# Patient Record
Sex: FEMALE | Race: WHITE | NOT HISPANIC OR LATINO | Employment: OTHER | URBAN - METROPOLITAN AREA
[De-identification: names, ages, dates, MRNs, and addresses within clinical notes are randomized per-mention and may not be internally consistent; named-entity substitution may affect disease eponyms.]

---

## 2017-01-10 ENCOUNTER — OFFICE VISIT (OUTPATIENT)
Dept: FAMILY MEDICINE CLINIC | Facility: CLINIC | Age: 82
End: 2017-01-10

## 2017-01-10 VITALS
BODY MASS INDEX: 38.87 KG/M2 | OXYGEN SATURATION: 97 % | WEIGHT: 198 LBS | SYSTOLIC BLOOD PRESSURE: 130 MMHG | HEART RATE: 64 BPM | TEMPERATURE: 97.6 F | DIASTOLIC BLOOD PRESSURE: 78 MMHG | HEIGHT: 60 IN

## 2017-01-10 DIAGNOSIS — R41.3 MEMORY IMPAIRMENT: Primary | ICD-10-CM

## 2017-01-10 PROCEDURE — 99213 OFFICE O/P EST LOW 20 MIN: CPT | Performed by: FAMILY MEDICINE

## 2017-01-10 NOTE — MR AVS SNAPSHOT
Suzanne Wild   1/10/2017 2:00 PM   Office Visit    Dept Phone:  [phone]   Encounter #:  [id number]    Provider:  Lizzette Buckley MD   Department:  [facility]                Your Full Care Plan              Your Updated Medication List          This list is accurate as of: 1/10/17  2:48 PM.  Always use your most recent med list.                ASPIR-81 PO       CALCIUM 600 + D PO       Vitamin D3 3000 UNITS tablet               We Performed the Following     Ambulatory Referral to Neuropsychology     Basic Metabolic Panel     Thyroid Cascade Profile     Vitamin B12 & Folate       You Were Diagnosed With        Codes Comments    Memory impairment    -  Primary ICD-10-CM: R41.3  ICD-9-CM: 780.93       Instructions     None    Patient Instructions History      Upcoming Appointments     Visit Type Date Time Department    OFFICE VISIT 1/10/2017  2:00 PM [facility]      [employer] Signup     [employer] allows you to send messages to your doctor, view your test results, renew your prescriptions, schedule appointments, and more. To sign up, go to [employer] and click on the Sign Up Now link in the New User? box. Enter your [employer] Activation Code exactly as it appears below along with the last four digits of your Social Security Number and your Date of Birth () to complete the sign-up process. If you do not sign up before the expiration date, you must request a new code.    [employer] Activation Code: [email or web address]  Expires: 2017  2:48 PM    If you have questions, you can email [email or web address] or call [phone] to talk to our [employer] staff. Remember, [employer] is NOT to be used for urgent needs. For medical emergencies, dial 911.               Other Info from Your Visit           Allergies     Codeine  Rash      Reason for Visit     Memory Loss           Vital Signs     Blood Pressure Pulse Temperature Height Weight Oxygen Saturation  "   130/78 64 97.6 °F (36.4 °C) 60\" (152.4 cm) 198 lb (89.8 kg) 97%    Body Mass Index Smoking Status                38.67 kg/m2 Never Smoker          Problems and Diagnoses Noted     Memory impairment    -  Primary        "

## 2017-01-10 NOTE — PROGRESS NOTES
Subjective   Suzanne Wild is a 83 y.o. female.   Chief Complaint   Patient presents with   • Memory Loss       History of Present Illness     #1 memory loss-started several years ago.  Patient cannot think of the word when she talks.  She forgets things that people told her.  She does not have problems with missing appointments, she has no problems with driving, or finding her way.  She does not misplace things.  No family history of Alzheimer disease.  She had a head CT done in June 2016 which was negative.    The following portions of the patient's history were reviewed and updated as appropriate: allergies, current medications, past family history, past medical history, past social history and problem list.    Review of Systems   Constitutional: Negative.    Respiratory: Negative.    Cardiovascular: Negative.    Psychiatric/Behavioral: Negative.  Negative for suicidal ideas. The patient is not nervous/anxious.          Objective   Wt Readings from Last 3 Encounters:   01/10/17 198 lb (89.8 kg)   06/26/16 200 lb (90.7 kg)   05/02/16 199 lb 8 oz (90.5 kg)      Vitals:    01/10/17 1324   BP: 130/78   Pulse: 64   Temp: 97.6 °F (36.4 °C)   SpO2: 97%     Temp Readings from Last 3 Encounters:   01/10/17 97.6 °F (36.4 °C)   06/26/16 97.1 °F (36.2 °C) (Tympanic)   05/02/16 97.8 °F (36.6 °C)     BP Readings from Last 3 Encounters:   01/10/17 130/78   06/26/16 151/96   05/02/16 140/78     Pulse Readings from Last 3 Encounters:   01/10/17 64   06/26/16 70   05/02/16 64       Physical Exam   Constitutional: She is oriented to person, place, and time. She appears well-developed and well-nourished.   HENT:   Head: Normocephalic and atraumatic.   Neck: Neck supple. Carotid bruit is not present. No thyromegaly present.   Cardiovascular: Normal rate, regular rhythm and normal heart sounds.    Pulmonary/Chest: Effort normal and breath sounds normal.   Neurological: She is alert and oriented to person, place, and time.   Skin:  Skin is warm, dry and intact.   Psychiatric: She has a normal mood and affect. Her behavior is normal.       Assessment/Plan   Suzanne was seen today for memory loss.    Diagnoses and all orders for this visit:    Memory impairment  -     Vitamin B12 & Folate  -     Thyroid Cascade Profile  -     Basic Metabolic Panel  -     Ambulatory Referral to Neuropsychology        #1 decreased memory-check labs.  Referral to neuropsychologist for testing.

## 2017-01-11 LAB
BUN SERPL-MCNC: 10 MG/DL (ref 8–23)
BUN/CREAT SERPL: 14.1 (ref 7–25)
CALCIUM SERPL-MCNC: 9.8 MG/DL (ref 8.6–10.5)
CHLORIDE SERPL-SCNC: 101 MMOL/L (ref 98–107)
CO2 SERPL-SCNC: 28.2 MMOL/L (ref 22–29)
CREAT SERPL-MCNC: 0.71 MG/DL (ref 0.57–1)
FOLATE SERPL-MCNC: 9.13 NG/ML (ref 4.78–24.2)
GLUCOSE SERPL-MCNC: 73 MG/DL (ref 65–99)
POTASSIUM SERPL-SCNC: 4.3 MMOL/L (ref 3.5–5.2)
SODIUM SERPL-SCNC: 141 MMOL/L (ref 136–145)
TSH SERPL DL<=0.005 MIU/L-ACNC: 1.81 UIU/ML (ref 0.45–4.5)
VIT B12 SERPL-MCNC: 240 PG/ML (ref 211–946)

## 2017-02-15 ENCOUNTER — OFFICE VISIT (OUTPATIENT)
Dept: FAMILY MEDICINE CLINIC | Facility: CLINIC | Age: 82
End: 2017-02-15

## 2017-02-15 DIAGNOSIS — G30.1 LATE ONSET ALZHEIMER'S DISEASE WITHOUT BEHAVIORAL DISTURBANCE (HCC): Primary | ICD-10-CM

## 2017-02-15 DIAGNOSIS — F02.80 LATE ONSET ALZHEIMER'S DISEASE WITHOUT BEHAVIORAL DISTURBANCE (HCC): Primary | ICD-10-CM

## 2017-02-15 PROCEDURE — 99213 OFFICE O/P EST LOW 20 MIN: CPT | Performed by: FAMILY MEDICINE

## 2017-02-15 RX ORDER — DONEPEZIL HYDROCHLORIDE 10 MG/1
10 TABLET, FILM COATED ORAL NIGHTLY
Qty: 90 TABLET | Refills: 1 | Status: SHIPPED | OUTPATIENT
Start: 2017-02-15 | End: 2017-07-11 | Stop reason: SDUPTHER

## 2017-02-15 NOTE — PROGRESS NOTES
Subjective   Suzanne Wild is a 83 y.o. female.   No chief complaint on file.      History of Present Illness     #1 Alzheimer disease with late-onset- at last office visit I referred patient for evaluation at [facility].  She is here today to discuss results.  Patient was diagnosed with emerging Alzheimer disease, with late onset.  It was recommended that she will be started on Aricept.  It was recommended that she will stop driving.  She was provided with brochures about support network.  Inflamation was also given to her son.  Patient has living will and DNR.  Her sister lives close to her and she is going to rely on her with driving.    The following portions of the patient's history were reviewed and updated as appropriate: allergies, current medications, past medical history, past social history and problem list.    Review of Systems   Constitutional: Negative.    Respiratory: Negative.    Cardiovascular: Negative.          Objective   Wt Readings from Last 3 Encounters:   01/10/17 198 lb (89.8 kg)   06/26/16 200 lb (90.7 kg)   05/02/16 199 lb 8 oz (90.5 kg)    There were no vitals filed for this visit.  Temp Readings from Last 3 Encounters:   01/10/17 97.6 °F (36.4 °C)   06/26/16 97.1 °F (36.2 °C) (Tympanic)   05/02/16 97.8 °F (36.6 °C)     BP Readings from Last 3 Encounters:   01/10/17 130/78   06/26/16 151/96   05/02/16 140/78     Pulse Readings from Last 3 Encounters:   01/10/17 64   06/26/16 70   05/02/16 64       Physical Exam   Constitutional: She is oriented to person, place, and time. She appears well-developed and well-nourished.   HENT:   Head: Normocephalic and atraumatic.   Neck: Neck supple. Carotid bruit is not present. No thyromegaly present.   Cardiovascular: Normal rate, regular rhythm and normal heart sounds.    Pulmonary/Chest: Effort normal and breath sounds normal.   Neurological: She is alert and oriented to person, place, and time.   Skin: Skin is warm, dry and intact.    Psychiatric: She has a normal mood and affect. Her behavior is normal.       Assessment/Plan   Diagnoses and all orders for this visit:    Late onset Alzheimer's disease without behavioral disturbance    Other orders  -     donepezil (ARICEPT) 10 MG tablet; Take 1 tablet by mouth Every Night.        #1 Alzheimer disease-new diagnosis, we are starting Aricept 10 mg a day.  We talked about advanced planning for the future.  I encouraged patient to discuss it with her family.  We talked about DNR.  Patient was advised not to drive.  Follow-up in 6 months, or sooner if problems.  She is due for follow-up at [facility] in January 2018.

## 2017-02-20 ENCOUNTER — TELEPHONE (OUTPATIENT)
Dept: FAMILY MEDICINE CLINIC | Facility: CLINIC | Age: 82
End: 2017-02-20

## 2017-02-20 NOTE — TELEPHONE ENCOUNTER
----- Message from Lizzette Buckley MD sent at 2/20/2017 11:02 AM EST -----  Not at this time.  ----- Message -----     From: Bonita Núñez MA     Sent: 2/20/2017  10:01 AM       To: MD Dr cristy Welch told her at the evaluation that she would be put on two medications. Should she also be on namenda

## 2017-07-11 ENCOUNTER — OFFICE VISIT (OUTPATIENT)
Dept: FAMILY MEDICINE CLINIC | Facility: CLINIC | Age: 82
End: 2017-07-11

## 2017-07-11 VITALS
HEART RATE: 80 BPM | BODY MASS INDEX: 36.52 KG/M2 | TEMPERATURE: 98.4 F | OXYGEN SATURATION: 94 % | WEIGHT: 186 LBS | SYSTOLIC BLOOD PRESSURE: 140 MMHG | HEIGHT: 60 IN | DIASTOLIC BLOOD PRESSURE: 80 MMHG

## 2017-07-11 DIAGNOSIS — F02.80 LATE ONSET ALZHEIMER'S DISEASE WITHOUT BEHAVIORAL DISTURBANCE (HCC): Primary | ICD-10-CM

## 2017-07-11 DIAGNOSIS — G30.1 LATE ONSET ALZHEIMER'S DISEASE WITHOUT BEHAVIORAL DISTURBANCE (HCC): Primary | ICD-10-CM

## 2017-07-11 PROCEDURE — 99213 OFFICE O/P EST LOW 20 MIN: CPT | Performed by: FAMILY MEDICINE

## 2017-07-11 RX ORDER — DONEPEZIL HYDROCHLORIDE 10 MG/1
10 TABLET, FILM COATED ORAL NIGHTLY
Qty: 90 TABLET | Refills: 1 | Status: SHIPPED | OUTPATIENT
Start: 2017-07-11 | End: 2017-11-01

## 2017-07-11 RX ORDER — LANOLIN ALCOHOL/MO/W.PET/CERES
1000 CREAM (GRAM) TOPICAL DAILY
COMMUNITY

## 2017-07-11 NOTE — PROGRESS NOTES
Subjective   Suzanne Wild is a 83 y.o. female.   Chief Complaint   Patient presents with   • Alzheimer's Disease       History of Present Illness     #1 Alzheimer disease - patient was evaluated by [facility] on 1/18/17. She was diagnosed with emerging Alzheimer disease, with late onset. It was recommended that she will be started on Aricept and to follow-up with them in a year.  In February we started Aricept 10 mg a day.  Patient takes it everyday.  She reports improvement.  It is easier for her to remember things.  She has no side effects.  She is upset because she understood that neuropsychologist recommended 2 medications for her, but when she called them back they recommended only Aricept.    Patient checks blood pressure at home and it stays in 110s 120s over 70s.    The following portions of the patient's history were reviewed and updated as appropriate: allergies, current medications, past medical history, past social history and problem list.    Review of Systems   Constitutional: Negative.    Respiratory: Negative.    Cardiovascular: Negative.          Objective   Wt Readings from Last 3 Encounters:   07/11/17 186 lb (84.4 kg)   01/10/17 198 lb (89.8 kg)   06/26/16 200 lb (90.7 kg)      Vitals:    07/11/17 1452   BP: 140/80   Pulse: 80   Temp: 98.4 °F (36.9 °C)   SpO2: 94%     Temp Readings from Last 3 Encounters:   07/11/17 98.4 °F (36.9 °C)   01/10/17 97.6 °F (36.4 °C)   06/26/16 97.1 °F (36.2 °C) (Tympanic)     BP Readings from Last 3 Encounters:   07/11/17 140/80   01/10/17 130/78   06/26/16 151/96     Pulse Readings from Last 3 Encounters:   07/11/17 80   01/10/17 64   06/26/16 70       Physical Exam   Constitutional: She is oriented to person, place, and time. She appears well-developed and well-nourished.   HENT:   Head: Normocephalic and atraumatic.   Neck: Neck supple. Carotid bruit is not present. No thyromegaly present.   Cardiovascular: Normal rate, regular rhythm and normal  heart sounds.    Pulmonary/Chest: Effort normal and breath sounds normal.   Neurological: She is alert and oriented to person, place, and time.   Skin: Skin is warm, dry and intact.   Psychiatric: She has a normal mood and affect. Her behavior is normal.       Assessment/Plan   Suzanne was seen today for alzheimer's disease.    Diagnoses and all orders for this visit:    Late onset Alzheimer's disease without behavioral disturbance  -     Ambulatory Referral to Neurology    Other orders  -     donepezil (ARICEPT) 10 MG tablet; Take 1 tablet by mouth Every Night.        #1 Alzheimer disease- will continue Aricept 10 mg a day, patient is interested in referral to neurologist and its done.

## 2017-08-25 ENCOUNTER — TELEPHONE (OUTPATIENT)
Dept: FAMILY MEDICINE CLINIC | Facility: CLINIC | Age: 82
End: 2017-08-25

## 2017-08-25 NOTE — TELEPHONE ENCOUNTER
----- Message from Lizzette Buckley MD sent at 8/25/2017  7:43 AM EDT -----  Please get letter ready. Thank you.  ----- Message -----     From: Jose Alejandro Larose     Sent: 8/24/2017   1:26 PM       To: Lizzette Buckley MD    Patient has jury duty on 9/5/17 and she wants to know if you will write a letter to exempt her from this? She needs letter to say she is permanently excused from jury duty due to her medical problems. Patient said she doesn't drive due to memory loss. Please advise.

## 2017-09-05 ENCOUNTER — TRANSCRIBE ORDERS (OUTPATIENT)
Dept: ADMINISTRATIVE | Facility: HOSPITAL | Age: 82
End: 2017-09-05

## 2017-09-05 DIAGNOSIS — Z12.31 VISIT FOR SCREENING MAMMOGRAM: Primary | ICD-10-CM

## 2017-10-02 ENCOUNTER — HOSPITAL ENCOUNTER (OUTPATIENT)
Dept: MAMMOGRAPHY | Facility: HOSPITAL | Age: 82
Discharge: HOME | End: 2017-10-02
Admitting: FAMILY MEDICINE

## 2017-10-02 DIAGNOSIS — Z12.31 VISIT FOR SCREENING MAMMOGRAM: ICD-10-CM

## 2017-10-02 PROCEDURE — G0202 SCR MAMMO BI INCL CAD: HCPCS

## 2017-11-01 ENCOUNTER — OFFICE VISIT (OUTPATIENT)
Dept: NEUROLOGY | Facility: CLINIC | Age: 82
End: 2017-11-01

## 2017-11-01 VITALS
HEART RATE: 62 BPM | WEIGHT: 183.4 LBS | DIASTOLIC BLOOD PRESSURE: 76 MMHG | SYSTOLIC BLOOD PRESSURE: 132 MMHG | OXYGEN SATURATION: 96 % | BODY MASS INDEX: 36.01 KG/M2 | HEIGHT: 60 IN

## 2017-11-01 DIAGNOSIS — F02.80 LATE ONSET ALZHEIMER'S DISEASE WITHOUT BEHAVIORAL DISTURBANCE (HCC): Primary | ICD-10-CM

## 2017-11-01 DIAGNOSIS — G30.1 LATE ONSET ALZHEIMER'S DISEASE WITHOUT BEHAVIORAL DISTURBANCE (HCC): Primary | ICD-10-CM

## 2017-11-01 PROCEDURE — 99204 OFFICE O/P NEW MOD 45 MIN: CPT | Performed by: PSYCHIATRY & NEUROLOGY

## 2017-11-01 RX ORDER — RIVASTIGMINE 4.6 MG/24H
1 PATCH, EXTENDED RELEASE TRANSDERMAL DAILY
Qty: 30 PATCH | Refills: 1 | Status: SHIPPED | OUTPATIENT
Start: 2017-11-01 | End: 2018-04-06 | Stop reason: DRUGHIGH

## 2017-11-01 NOTE — PROGRESS NOTES
Subjective:     Patient ID: Suzanne Wild is a 84 y.o. female.    History of Present Illness  The following portions of the patient's history were reviewed and updated as appropriate: allergies, current medications, past family history, past medical history, past social history, past surgical history and problem list.  DEMENTIA-3 yr hx of AD, with dx noted on neuropsychology test jan 2017. No hx stroke. One bad CHI yrs ago. No sz. Hx.  CT brain-no hydroceph or cva.  Nl b12 folate tsh earlier this year    Chronic neck pain.  Being evaluated by PCP      Reports her memory is impaired-- she was seen by Alonso Noel (psych) and diagnosed with alzheimer's disease and he recommended aricept and namenda but her previous PCP was reluctant to start both.  Currently on Aricept 10 mg daily and has noted no improvement whatsoever but no side effects.  Other medicines also noted.    Other medical history significant for orthopedic issues involving her neck and lower back.    Review of Systems   Constitutional: Positive for activity change, appetite change, chills and fatigue.   HENT: Positive for hearing loss, postnasal drip, tinnitus and voice change. Negative for ear pain, facial swelling and trouble swallowing.    Eyes: Positive for itching. Negative for photophobia, pain and visual disturbance.   Respiratory: Positive for shortness of breath and stridor. Negative for choking and chest tightness.    Cardiovascular: Negative for chest pain, palpitations and leg swelling.   Gastrointestinal: Positive for abdominal distention. Negative for abdominal pain, constipation and nausea.   Endocrine: Positive for cold intolerance. Negative for polydipsia, polyphagia and polyuria.   Genitourinary: Positive for enuresis. Negative for difficulty urinating, frequency and urgency.   Musculoskeletal: Positive for arthralgias, back pain, gait problem, myalgias and neck pain.   Skin: Positive for color change. Negative for rash and wound.    Allergic/Immunologic: Positive for environmental allergies. Negative for food allergies and immunocompromised state.   Neurological: Positive for dizziness, speech difficulty, weakness, light-headedness and headaches. Negative for tremors, seizures, syncope, facial asymmetry and numbness.   Hematological: Negative for adenopathy. Does not bruise/bleed easily.   Psychiatric/Behavioral: Positive for agitation, confusion and decreased concentration. Negative for behavioral problems, dysphoric mood, hallucinations, self-injury, sleep disturbance and suicidal ideas. The patient is not nervous/anxious and is not hyperactive.         Objective:    Neurologic Exam  This patient was well-developed, well-nourished and in no acute distress.      GAIT:  Gait, painful.  Not wide-based.  No drift     VASCULAR: The carotid arteries had normal upstroke to palpation without bruits.  The vertebral arteries were without bruits.  The radial pulses were equal and without delay.  Cardiac examination revealed no murmurs with a regular rhythm.  Pedal pulses were normal.  The extremities were without cyanosis, clubbing or edema.    MENTAL STATUS: This patient was alert and oriented to person, place, time and situation.  Clock drawing was normal.  Placement of the hands normal.  Sentence repetition abnormal.  Similarities normal.  Serial sevens-no correct answers.  Remembered 2 out of 3 words after 5 minutes.  Oriented to person place time date season day of the week name of the billing year    CRANIAL NERVES:  Olfaction-not tested.  Visual fields full in all quadrants to confrontation.  The pupils were equally round and reactive to light at 2mm. cataracts bilaterally There was no evidence of a Dada Joahna pupil.  Funduscopic exam revealed no papilledema, hemorrhage or exudate.  The gaze was conjugate. The vertical and horizontal eye movements were full without nystagmus.  There was no facial weakness.  There was no facial sensory loss to  pinprick bilaterally.  Hearing was normal for light finger rub and casual speech.  Speech is normal without dysarthria.  The neck is supple and strength is normal in rotation, flexion and extension.  Tongue and palate movements are normal.    MOTOR UPPER EXTREMTIES:   Bilaterally the deltoid, biceps, triceps, wrist extensors, intrinsic hand muscles, and  were grade 5 and normal.  Bulk, tone and strength of these muscles were normal without abnormal movements.    MOTOR LOWER EXTREMITIES: Bilaterally the hip flexors, hip abductors, knee flexors and extensors, ankle plantar flexors and dorsiflexors were grade 5 with normal. Bulk, tone and strength of these muscles were normal without abnormal movements.  DEEP TENDON REFLEXES:  Bilateral biceps, triceps, and brachioradialis reflexes were grade 1 symmetric.  Bilateral knee, hamstrings and ankle reflexes were grade 1 and symmetric.  The plantar responses were downgoing.  Ankle clonus was not present.    CEREBELLAR:  Finger to nose, rapid finger opposition, and heel-to-shin tests were performed normally, bilaterally.  Knee pain with doing to shin testing         SENSORY: There was normal upper and lower extremity sensation to vibration, proprioception, and pinprick.  HIGHER CORTICAL FUNCTION: There were no aphasic or apraxic errors.  Visual fields were intact to confrontation.      Physical Exam   Constitutional: She appears well-developed and well-nourished.   Neck: Normal range of motion. Neck supple.   Cardiovascular: Normal rate.    Pulmonary/Chest: Effort normal.   Psychiatric: She has a normal mood and affect. Her behavior is normal.   Nursing note and vitals reviewed.      Assessment/Plan:       Problems Addressed this Visit        Unprioritized    Late onset Alzheimer disease - Primary    Relevant Medications    rivastigmine (EXELON) 4.6 MG/24HR patch       Alzheimer's disease with lack of efficacy while taking Aricept  Plan: Discontinue Aricept.  Begin Exelon  patch 4.6 daily.  After one month they will call me to increase to 9.5 daily.    If the medicine is highly expensive we may try Exelon pills but 80% of the people cannot take it because of nausea    Namenda can be held in reserve pending the outcome with this medicine switch.    One follow-up visit in 4 months to review meds.    I reviewed the brain CT images

## 2017-11-29 ENCOUNTER — TELEPHONE (OUTPATIENT)
Dept: NEUROLOGY | Facility: CLINIC | Age: 82
End: 2017-11-29

## 2017-11-29 NOTE — TELEPHONE ENCOUNTER
----- Message from Corby Selby sent at 11/29/2017 11:51 AM EST -----  Contact: [phone]  Pt called and said that she thinks the Exelon 4.6mg is making it worse.  She would like to speak with Dr Church about this issue,  Please Advice.

## 2017-12-11 ENCOUNTER — TELEPHONE (OUTPATIENT)
Dept: NEUROLOGY | Facility: CLINIC | Age: 82
End: 2017-12-11

## 2017-12-11 RX ORDER — MEMANTINE HYDROCHLORIDE 5 MG-10 MG
KIT ORAL
Qty: 1 PACKAGE | Refills: 0 | Status: SHIPPED | OUTPATIENT
Start: 2017-12-11 | End: 2018-01-25

## 2017-12-11 NOTE — TELEPHONE ENCOUNTER
I called santos Patino couldn't handle the exelon patch. Patient told me that aricept didn't help. She restarted aricept.    Patient wants to take namenda. SO I will call in the starter Josiah.

## 2018-01-25 NOTE — TELEPHONE ENCOUNTER
She has finished the titration pack and ready for the next step I was not certain on the dose since her sistet called and said she was taking 15 am and 15 pm

## 2018-01-26 RX ORDER — MEMANTINE HYDROCHLORIDE 10 MG/1
10 TABLET ORAL DAILY
Qty: 30 TABLET | Refills: 2 | Status: SHIPPED | OUTPATIENT
Start: 2018-01-26 | End: 2018-04-06 | Stop reason: SDUPTHER

## 2018-04-04 RX ORDER — DONEPEZIL HYDROCHLORIDE 10 MG/1
TABLET, FILM COATED ORAL
Qty: 90 TABLET | Refills: 1 | OUTPATIENT
Start: 2018-04-04

## 2018-04-06 ENCOUNTER — OFFICE VISIT (OUTPATIENT)
Dept: NEUROLOGY | Facility: CLINIC | Age: 83
End: 2018-04-06

## 2018-04-06 VITALS — WEIGHT: 187.4 LBS | HEIGHT: 60 IN | BODY MASS INDEX: 36.79 KG/M2

## 2018-04-06 DIAGNOSIS — F02.80 LATE ONSET ALZHEIMER'S DISEASE WITHOUT BEHAVIORAL DISTURBANCE (HCC): Primary | ICD-10-CM

## 2018-04-06 DIAGNOSIS — G30.1 LATE ONSET ALZHEIMER'S DISEASE WITHOUT BEHAVIORAL DISTURBANCE (HCC): Primary | ICD-10-CM

## 2018-04-06 PROCEDURE — 99213 OFFICE O/P EST LOW 20 MIN: CPT | Performed by: PSYCHIATRY & NEUROLOGY

## 2018-04-06 RX ORDER — MEMANTINE HYDROCHLORIDE 10 MG/1
TABLET ORAL
Qty: 60 TABLET | Refills: 6 | Status: SHIPPED | OUTPATIENT
Start: 2018-04-06 | End: 2018-10-08 | Stop reason: SDUPTHER

## 2018-04-06 RX ORDER — DONEPEZIL HYDROCHLORIDE 10 MG/1
10 TABLET, FILM COATED ORAL NIGHTLY
COMMUNITY
End: 2018-10-08 | Stop reason: SDUPTHER

## 2018-04-06 NOTE — PROGRESS NOTES
Subjective:     Patient ID: Suzanne Wild is a 84 y.o. female.    History of Present Illness  The following portions of the patient's history were reviewed and updated as appropriate: allergies, current medications, past family history, past medical history, past social history, past surgical history and problem list.    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 No hx stroke. One bad CHI yrs ago. No sz. Hx.  CT brain-no hydroceph or cva.  Nl b12 folate tsh earlier this year. MRI of IAC and brain in October 2014 essentially normal, w/o ventriculomegaly. Scan reviewed today.       Reports her memory is stable-    TESTING IN PAST- she was seen by Alonso Noel (psych) and diagnosed with alzheimer's disease and he recommended aricept and namenda but her previous PCP was reluctant to start both.Tried Aricept 10 mg daily and had noted no improvement whatsoever but no side effects. So I prescribed exelon patch at the November OV at 4.5 mg/d Cost issues prevented her from trying it. SO       Retried aricept in December.  Then I called in namenda starter pack. Now on namenda at 10 mg qhs [not bid]  + aricept 10 mg daily.The namenda dose dropped from 10 bid to 10 qd in late January. She is not sure if it made a difference, [but no AE]  AE- weight up, not down.Oriented to PPT.  Nonfocal exam.  Review of Systems   Constitutional: Positive for fatigue. Negative for activity change and appetite change.   HENT: Negative for ear pain, facial swelling and trouble swallowing.    Eyes: Negative for photophobia, pain and visual disturbance.   Respiratory: Negative for choking, chest tightness and shortness of breath.    Cardiovascular: Negative for chest pain, palpitations and leg swelling.   Gastrointestinal: Negative for abdominal pain, constipation and nausea.   Endocrine: Positive for polydipsia. Negative for polyphagia and polyuria.   Genitourinary: Negative  for difficulty urinating, frequency and urgency.   Musculoskeletal: Positive for arthralgias, back pain, gait problem and neck pain.   Skin: Negative for color change, rash and wound.   Allergic/Immunologic: Positive for environmental allergies. Negative for food allergies and immunocompromised state.   Neurological: Positive for dizziness, tremors, speech difficulty, weakness, light-headedness and headaches. Negative for seizures, syncope, facial asymmetry and numbness.   Hematological: Negative for adenopathy. Does not bruise/bleed easily.   Psychiatric/Behavioral: Positive for agitation, confusion and decreased concentration. Negative for behavioral problems, dysphoric mood, hallucinations, self-injury, sleep disturbance and suicidal ideas. The patient is nervous/anxious. The patient is not hyperactive.         Objective:    Neurologic Exam     Mental Status   Oriented to person, place, and time.   Recall at 5 minutes: recalls 2 of 3 objects. Follows 3 step commands.   Attention: normal. Concentration: normal.   Speech: speech is normal   Level of consciousness: alert  Knowledge: inconsistent with education. Able to perform simple calculations.   Able to name object. Able to read. Able to repeat. Able to write.     Cranial Nerves   Cranial nerves II through XII intact.     CN II   Visual fields full to confrontation.     CN III, IV, VI   Right pupil: Size: 3 mm.   Left pupil: Size: 3 mm.   Normal fundi     Motor Exam   Muscle bulk: normal  Overall muscle tone: normal    Sensory Exam   Light touch normal.     Gait, Coordination, and Reflexes     Coordination   Romberg: negative  Finger to nose coordination: normal    Tremor   Resting tremor: absent  Intention tremor: absent  Action tremor: absent    Reflexes   Right biceps: 1+  Left biceps: 1+  Right triceps: 1+  Left triceps: 1+Mildly antalgic gait without shuffle or ataxia       Physical Exam   Constitutional: She is oriented to person, place, and time. She  appears well-developed and well-nourished.   Neck: Normal range of motion. Neck supple.   Cardiovascular: Normal rate.    Pulmonary/Chest: Effort normal.   Neurological: She is oriented to person, place, and time. She has a normal Finger-Nose-Finger Test and a normal Romberg Test.   Reflex Scores:       Tricep reflexes are 1+ on the right side and 1+ on the left side.       Bicep reflexes are 1+ on the right side and 1+ on the left side.  Psychiatric: She has a normal mood and affect. Her speech is normal and behavior is normal.   Nursing note and vitals reviewed.      Assessment/Plan:       Problems Addressed this Visit        Unprioritized    Late onset Alzheimer disease - Primary    Relevant Medications    donepezil (ARICEPT) 10 MG tablet    memantine (NAMENDA) 10 MG tablet      Other Visit Diagnoses    None.          Mentally she seems to fit more mild cognitive impairment and Alzheimer's even though her testing was not up to that level.  He is going to increase the Namenda to 10 mg twice daily over 2 weeks and continue Aricept 10 mg daily.  Suma was visit we may consider switching toamzaric, if all is tolerated.

## 2018-05-30 RX ORDER — DONEPEZIL HYDROCHLORIDE 10 MG/1
TABLET, FILM COATED ORAL
Qty: 90 TABLET | Refills: 1 | OUTPATIENT
Start: 2018-05-30

## 2018-06-01 RX ORDER — DONEPEZIL HYDROCHLORIDE 10 MG/1
TABLET, FILM COATED ORAL
Qty: 90 TABLET | Refills: 1 | OUTPATIENT
Start: 2018-06-01

## 2018-06-07 RX ORDER — DONEPEZIL HYDROCHLORIDE 10 MG/1
TABLET, FILM COATED ORAL
Qty: 90 TABLET | Refills: 1 | OUTPATIENT
Start: 2018-06-07

## 2018-10-08 ENCOUNTER — OFFICE VISIT (OUTPATIENT)
Dept: NEUROLOGY | Facility: CLINIC | Age: 83
End: 2018-10-08

## 2018-10-08 VITALS
HEIGHT: 60 IN | SYSTOLIC BLOOD PRESSURE: 140 MMHG | OXYGEN SATURATION: 96 % | DIASTOLIC BLOOD PRESSURE: 68 MMHG | WEIGHT: 183.2 LBS | BODY MASS INDEX: 35.97 KG/M2 | HEART RATE: 82 BPM

## 2018-10-08 DIAGNOSIS — F02.80 LATE ONSET ALZHEIMER'S DISEASE WITHOUT BEHAVIORAL DISTURBANCE (HCC): Primary | ICD-10-CM

## 2018-10-08 DIAGNOSIS — G30.1 LATE ONSET ALZHEIMER'S DISEASE WITHOUT BEHAVIORAL DISTURBANCE (HCC): Primary | ICD-10-CM

## 2018-10-08 PROCEDURE — 99213 OFFICE O/P EST LOW 20 MIN: CPT | Performed by: PSYCHIATRY & NEUROLOGY

## 2018-10-08 RX ORDER — MEMANTINE HYDROCHLORIDE 10 MG/1
TABLET ORAL
Qty: 180 TABLET | Refills: 11 | Status: SHIPPED | OUTPATIENT
Start: 2018-10-08

## 2018-10-08 RX ORDER — DONEPEZIL HYDROCHLORIDE 10 MG/1
10 TABLET, FILM COATED ORAL NIGHTLY
Qty: 90 TABLET | Refills: 11 | Status: SHIPPED | OUTPATIENT
Start: 2018-10-08

## 2018-10-08 NOTE — PATIENT INSTRUCTIONS
Lidocaine dermal patch  What is this medicine?  LIDOCAINE (LYE logan burgess) causes loss of feeling in the skin and surrounding area. The medicine helps treat pain, including nerve pain.  This medicine may be used for other purposes; ask your health care provider or pharmacist if you have questions.  COMMON BRAND NAME(S): Lidocare, Lidoderm  What should I tell my health care provider before I take this medicine?  They need to know if you have any of these conditions:  -heart disease  -history of irregular heart beat  -liver disease  -skin conditions or sensitivity  -skin infection  -an unusual or allergic reaction to lidocaine, parabens, other medicines, foods, dyes, or preservatives  -pregnant or trying to get pregnant  -breast-feeding  How should I use this medicine?  This medicine is for external use only. Follow the directions on the prescription label or package.  Talk to your pediatrician regarding the use of this medicine in children. While this drug may be prescribed for children as young as 12 years for selected conditions, precautions do apply.  Overdosage: If you think you have taken too much of this medicine contact a poison control center or emergency room at once.  NOTE: This medicine is only for you. Do not share this medicine with others.  What if I miss a dose?  Apply the patches as needed for pain.  What may interact with this medicine?  Do not take this medicine with any of the following medications:  -certain medicines for irregular heart beat  -MAOIs like Carbex, Eldepryl, Marplan, Nardil, and Parnate  This medicine may also interact with the following medications:  -other local anesthetics like pramoxine, tetracaine  Do not use any other skin products on the affected area without asking your doctor or health care professional.  This list may not describe all possible interactions. Give your health care provider a list of all the medicines, herbs, non-prescription drugs, or dietary supplements you  use. Also tell them if you smoke, drink alcohol, or use illegal drugs. Some items may interact with your medicine.  What should I watch for while using this medicine?  Tell your doctor or healthcare professional if your symptoms do not start to get better or if they get worse.  Be careful to avoid injury while the area is numb from the medicine, and you are not aware of pain.  If you are going to need surgery, a MRI, CT scan, or other procedure, tell your doctor that you are using this medicine. You may need to remove this patch before the procedure.  Do not get this medicine in your eyes. If you do, rinse out with plenty of cool tap water.  This medicine can make certain skin conditions worse. Only use it for conditions for which your doctor or health care professional has prescribed.  What side effects may I notice from receiving this medicine?  Side effects that you should report to your doctor or health care professional as soon as possible:  -allergic reactions like skin rash, itching or hives, swelling of the face, lips, or tongue  -breathing problems  -chest pain or chest tightness  -dizzines  Side effects that usually do not require medical attention (report to your doctor or health care professional if they continue or are bothersome):  -tingling, numbness at site where applied  This list may not describe all possible side effects. Call your doctor for medical advice about side effects. You may report side effects to FDA at 9-443-FDA-7701.  Where should I keep my medicine?  Keep out of the reach of children.  See product for storage instructions. Each product may have different instructions.  NOTE: This sheet is a summary. It may not cover all possible information. If you have questions about this medicine, talk to your doctor, pharmacist, or health care provider.  © 2018 [employer] (2016-10-28 11:05:19)

## 2018-10-08 NOTE — PROGRESS NOTES
Subjective:     Patient ID: Suzanne Wild is a 85 y.o. female.    History of Present Illness  The following portions of the patient's history were reviewed and updated as appropriate: allergies, current medications, past family history, past medical history, past social history, past surgical history and problem list.      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 No hx stroke.   One bad CHI yrs ago.    No sz.     CT brain-no hydroceph or cva.  Nl b12 folate tsh        MRI of IAC and brain in October 2014 essentially normal, w/o ventriculomegaly. Scan reviewed today.        TESTING IN PAST- she was seen by Alonso Noel (psych) and diagnosed with alzheimer's disease and he recommended aricept and namenda but her previous PCP was reluctant to start both.  Retested by Lorna in Jan 2017 w dx of emerging AD.    On  Aricept 10 mg daily and namenda 10 mg bid.    Not sure if it is helping.     I questioned if the 1st N-psych test was overread- SHe agreed it is.possible as she was tired that day and test was after 5PM    Driving- none except to [place].    Chronic LBP-  Sees dr Puri.       Review of Systems   Constitutional: Negative for activity change, appetite change and fatigue.   HENT: Negative for ear pain, facial swelling and trouble swallowing.    Eyes: Negative for photophobia, pain and visual disturbance.   Respiratory: Negative for choking, chest tightness and shortness of breath.    Cardiovascular: Negative for chest pain, palpitations and leg swelling.   Gastrointestinal: Negative for abdominal pain, constipation and nausea.   Endocrine: Negative for polydipsia, polyphagia and polyuria.   Genitourinary: Positive for frequency. Negative for difficulty urinating and urgency.   Musculoskeletal: Positive for back pain, gait problem and neck pain.   Skin: Negative for color change, rash and wound.   Allergic/Immunologic: Negative for environmental  allergies, food allergies and immunocompromised state.   Neurological: Positive for dizziness, tremors, speech difficulty, weakness, light-headedness and headaches. Negative for seizures, syncope, facial asymmetry and numbness.   Hematological: Negative for adenopathy. Bruises/bleeds easily.   Psychiatric/Behavioral: Positive for agitation, confusion, decreased concentration, hallucinations and sleep disturbance. Negative for behavioral problems, dysphoric mood, self-injury and suicidal ideas. The patient is nervous/anxious. The patient is not hyperactive.         Objective:    Neurologic Exam  Oriented to person, place, and time.   Recall at 5 minutes: recalls 2 of 3 objects. Follows 3 step commands.   Attention: normal. Concentration: normal.   Speech: speech is normal   Level of consciousness: alert  Knowledge: inconsistent with education. Able to perform simple calculations.   Able to name object. Able to read. Able to repeat. Able to write.      [place] cognitive assessment score was 18 including 4 points lost on delayed recall, another on trails B and copying the figure.  Only got one correct or on serial sevens.    Cranial Nerves   Cranial nerves II through XII intact.      CN II   Visual fields full to confrontation.      CN III, IV, VI   Right pupil: Size: 3 mm.   Left pupil: Size: 3 mm.   Normal fundi      Motor Exam   Muscle bulk: normal  Overall muscle tone: normal     Sensory Exam   Light touch normal.      Gait, Coordination, and Reflexes      Coordination   Romberg: negative  Finger to nose coordination: normal     Tremor   Resting tremor: absent  Intention tremor: absent  Action tremor: absent     Reflexes   Right biceps: 1+  Left biceps: 1+  Right triceps: 1+  Left triceps: 1+Mildly antalgic gait without shuffle or ataxia   Physical Exam   Constitutional: She appears well-developed and well-nourished.   Neck: Normal range of motion. Neck supple.   Cardiovascular: Normal rate.    Pulmonary/Chest:  "Effort normal.   Psychiatric:   Alert.  Did not seem anxious.  Thought content obviously abnormal as dictated on the [place] test.  Judgment may be slightly abnormal to.   Nursing note and vitals reviewed.      Assessment/Plan:       Problems Addressed this Visit        Unprioritized    Late onset Alzheimer disease - Primary    Relevant Medications    donepezil (ARICEPT) 10 MG tablet    memantine (NAMENDA) 10 MG tablet           The [place] testing today confirms Alzheimer's disease.  The diseases of a moderate nature at this point.  She was a little nervous and probably could do better than 1 out of 5 on delayed recall.  In fact later she remembered \"pencil allDriverSideator Virgen\" which were the 3 words I gave her 6 months ago!    At any rate we are going to stick with the current doses of medicines.  She is going to follow-up with her PCP and return here only when necessary.        "